# Patient Record
Sex: FEMALE | Race: ASIAN | NOT HISPANIC OR LATINO
[De-identification: names, ages, dates, MRNs, and addresses within clinical notes are randomized per-mention and may not be internally consistent; named-entity substitution may affect disease eponyms.]

---

## 2018-02-20 PROBLEM — Z00.00 ENCOUNTER FOR PREVENTIVE HEALTH EXAMINATION: Status: ACTIVE | Noted: 2018-02-20

## 2018-02-27 ENCOUNTER — APPOINTMENT (OUTPATIENT)
Dept: ULTRASOUND IMAGING | Facility: IMAGING CENTER | Age: 38
End: 2018-02-27

## 2018-02-27 ENCOUNTER — APPOINTMENT (OUTPATIENT)
Dept: MAMMOGRAPHY | Facility: IMAGING CENTER | Age: 38
End: 2018-02-27

## 2019-11-19 ENCOUNTER — EMERGENCY (EMERGENCY)
Facility: HOSPITAL | Age: 39
LOS: 1 days | Discharge: HOME | End: 2019-11-19
Admitting: EMERGENCY MEDICINE
Payer: MEDICAID

## 2019-11-19 VITALS
DIASTOLIC BLOOD PRESSURE: 87 MMHG | OXYGEN SATURATION: 100 % | HEART RATE: 72 BPM | TEMPERATURE: 98 F | SYSTOLIC BLOOD PRESSURE: 151 MMHG | RESPIRATION RATE: 16 BRPM

## 2019-11-19 PROCEDURE — 99283 EMERGENCY DEPT VISIT LOW MDM: CPT

## 2019-11-19 RX ORDER — OXYCODONE AND ACETAMINOPHEN 5; 325 MG/1; MG/1
1 TABLET ORAL ONCE
Refills: 0 | Status: DISCONTINUED | OUTPATIENT
Start: 2019-11-19 | End: 2019-11-19

## 2019-11-19 RX ADMIN — OXYCODONE AND ACETAMINOPHEN 1 TABLET(S): 5; 325 TABLET ORAL at 02:50

## 2019-11-19 NOTE — ED PROVIDER NOTE - TOOTH FINDINGS
PERCUSSION TENDERNESS/+ percussion tenderness of the 20th tooth. dental filling in place. no abscess formation noted

## 2019-11-19 NOTE — ED PROVIDER NOTE - OBJECTIVE STATEMENT
Patient is a 40 y/o F presents with pain of the 20th tooth x today. patient was evaluated by dentist today and a  temporary filling was placed in tooth, she was started on clindamycin and tylenol and ibuprofen. patient is scheduled for root canal on monday. she denies fever, chills, n/v, sore throat.

## 2019-11-19 NOTE — ED ADULT TRIAGE NOTE - CHIEF COMPLAINT QUOTE
pt arrives w/ c/o lower tooth pain. pt states she needs root canal but did not get it yet. pt states on abx right now and Motrin but pain not improving.

## 2019-11-19 NOTE — ED PROVIDER NOTE - NSFOLLOWUPINSTRUCTIONS_ED_ALL_ED_FT
Please continue to take medications as prescribed. If you experience worsening pain, fever, chills, n/v, return to the ED

## 2019-11-19 NOTE — ED PROVIDER NOTE - CLINICAL SUMMARY MEDICAL DECISION MAKING FREE TEXT BOX
38 y/o M presents with acute dental pain of 20th tooth after procedure today. no abscess noted. patient will be treated with analgesic and advised to f/u with Dentist as scheduled.

## 2019-11-19 NOTE — ED PROVIDER NOTE - PATIENT PORTAL LINK FT
You can access the FollowMyHealth Patient Portal offered by [facility] by registering at the following website: [email or web address]. By joining [employer]’s FollowMyHealth portal, you will also be able to view your health information using other applications (apps) compatible with our system.

## 2022-09-20 PROBLEM — Z78.9 OTHER SPECIFIED HEALTH STATUS: Chronic | Status: ACTIVE | Noted: 2019-11-19

## 2022-11-22 ENCOUNTER — APPOINTMENT (OUTPATIENT)
Dept: DERMATOLOGY | Facility: CLINIC | Age: 42
End: 2022-11-22

## 2023-07-11 ENCOUNTER — NON-APPOINTMENT (OUTPATIENT)
Age: 43
End: 2023-07-11

## 2025-03-21 ENCOUNTER — EMERGENCY (EMERGENCY)
Facility: HOSPITAL | Age: 45
LOS: 1 days | Discharge: HOME | End: 2025-03-21
Attending: EMERGENCY MEDICINE | Admitting: EMERGENCY MEDICINE
Payer: MEDICAID

## 2025-03-21 VITALS
SYSTOLIC BLOOD PRESSURE: 121 MMHG | RESPIRATION RATE: 18 BRPM | TEMPERATURE: 98 F | WEIGHT: 149.91 LBS | OXYGEN SATURATION: 98 % | DIASTOLIC BLOOD PRESSURE: 66 MMHG | HEART RATE: 82 BPM | HEIGHT: 63 IN

## 2025-03-21 LAB
APPEARANCE UR: ABNORMAL
BACTERIA # UR AUTO: ABNORMAL /HPF
BILIRUB UR-MCNC: NEGATIVE — SIGNIFICANT CHANGE UP
CAST: 0 /LPF — SIGNIFICANT CHANGE UP (ref 0–4)
COLOR SPEC: ABNORMAL
DIFF PNL FLD: ABNORMAL
GLUCOSE UR QL: NEGATIVE MG/DL — SIGNIFICANT CHANGE UP
KETONES UR-MCNC: NEGATIVE MG/DL — SIGNIFICANT CHANGE UP
LEUKOCYTE ESTERASE UR-ACNC: ABNORMAL
NITRITE UR-MCNC: NEGATIVE — SIGNIFICANT CHANGE UP
PH UR: 6.5 — SIGNIFICANT CHANGE UP (ref 5–8)
PROT UR-MCNC: 100 MG/DL
RBC CASTS # UR COMP ASSIST: 177 /HPF — HIGH (ref 0–4)
SP GR SPEC: 1.01 — SIGNIFICANT CHANGE UP (ref 1–1.03)
SQUAMOUS # UR AUTO: 1 /HPF — SIGNIFICANT CHANGE UP (ref 0–5)
UROBILINOGEN FLD QL: 0.2 MG/DL — SIGNIFICANT CHANGE UP (ref 0.2–1)
WBC UR QL: 1109 /HPF — HIGH (ref 0–5)

## 2025-03-21 PROCEDURE — 99284 EMERGENCY DEPT VISIT MOD MDM: CPT

## 2025-03-21 RX ORDER — NITROFURANTOIN MACROCRYSTAL 100 MG
1 CAPSULE ORAL
Qty: 10 | Refills: 0
Start: 2025-03-21 | End: 2025-03-25

## 2025-03-21 RX ORDER — NITROFURANTOIN MACROCRYSTAL 100 MG
100 CAPSULE ORAL ONCE
Refills: 0 | Status: COMPLETED | OUTPATIENT
Start: 2025-03-21 | End: 2025-03-21

## 2025-03-21 RX ADMIN — Medication 100 MILLIGRAM(S): at 08:30

## 2025-03-21 NOTE — ED ADULT NURSE NOTE - CCCP TRG CHIEF CMPLNT
----- Message from Gay Tiara sent at 8/3/2023  3:31 PM CDT -----  Regarding: advice  Contact: patient  Type: Needs Medical Advice  Who Called:  patient  Symptoms (please be specific):    How long has patient had these symptoms:    Pharmacy name and phone #:    [facility]  [address] [facility] [address] [place] [address]  Phone: [phone] Fax: [phone]      Best Call Back Number: [phone] (home)     Additional Information: Patient states he picked up his paperwork from the office and the prescription for the apixaban (ELIQUIS) 2.5 mg Tab was not with the paperwork.  Please call patient to advise.  Thanks!         urinary symptoms

## 2025-03-21 NOTE — ED PROVIDER NOTE - ATTENDING CONTRIBUTION TO CARE
Raheel: I have seen and examined the patient face to face, have reviewed and addended the HPI, PE and a/p as necessary.     43 yo F with hypothyroidism a/w lower abdominal pain and dysuria and polyuria.  PT reports having feeling of incomplete bladder emptying.  Reports history of UTIs.    She has not had any UTIs recently.  Does not have any back pain any fevers or chills nausea or vomiting.  No lower extremity swelling recent travel any vaginal bleeding or discharge.    GEN - NAD; well appearing; A+O x3; non-toxic appearing  CARD -s1s2, RRR, no M,G,R;   PULM - CTA b/l, symmetric breath sounds;   ABD -  +BS, TTP in suprapubic region, soft, no guarding, no rebound, no masses;   BACK - no CVA tenderness, Normal  spine;   EXT - symmetric pulses, 2+ dp, capillary refill < 2 seconds, no cyanosis, no edema;   NEURO - no focal neuro deficits, no slurred speech    Concern for UTI, no flank pain, no lateral abdominal tenderness no fevers or chills to suggest pyelonephritis or kidney stone.  Will treat symptomatically and likely d/c home with outpatient follow up.

## 2025-03-21 NOTE — ED PROVIDER NOTE - PATIENT PORTAL LINK FT
You can access the FollowMyHealth Patient Portal offered by [facility] by registering at the following website: [email or web address]. By joining [employer]’s FollowMyHealth portal, you will also be able to view your health information using other applications (apps) compatible with our system.

## 2025-03-21 NOTE — ED PROVIDER NOTE - PHYSICAL EXAMINATION
Alert and oriented x 4 resting in bed comfortably.  Heart and lung sounds grossly normal to auscultation.  Suprapubic tenderness but otherwise benign abdominal exam.  No CVA tenderness to percussion.  Lower extremities without edema bilaterally.

## 2025-03-21 NOTE — ED PROVIDER NOTE - PROGRESS NOTE DETAILS
UA positive, urine pregnancy point-of-care test negative, glucose 97.  Will discharge home with antibiotics.  Patient is comfortable and agrees with this plan.  Return indications discussed.  Patient to follow-up with PCP for hematuria which we discussed.

## 2025-03-21 NOTE — ED PROVIDER NOTE - NSFOLLOWUPINSTRUCTIONS_ED_ALL_ED_FT
A urinary tract infection (UTI) is an infection caused by bacteria. It can happen anywhere in the urinary tract. A UTI can happen in the:    Kidneys.  Ureters, the tubes that connect the kidneys to the bladder.  Bladder.  Urethra, where the urine comes out.  Most UTIs are bladder infections. They often cause pain or burning when you urinate.    Most UTIs can be cured with antibiotics. If you are prescribed antibiotics, be sure to complete your treatment so that the infection does not get worse.    Follow-up care is a key part of your treatment and safety. Be sure to make and go to all appointments, and call your doctor if you are having problems. It's also a good idea to know your test results and keep a list of the medicines you take.    How can you care for yourself at home?  Take your antibiotics as directed. Do not stop taking them just because you feel better. You need to take the full course of antibiotics.  Drink extra water and other fluids for the next day or two. This will help make the urine less concentrated and help wash out the bacteria that are causing the infection. (If you have kidney, heart, or liver disease and have to limit fluids, talk with your doctor before you increase the amount of fluids you drink.)  Avoid drinks that are carbonated or have caffeine. They can irritate the bladder.  Urinate often. Try to empty your bladder each time.  To relieve pain, take a hot bath or lay a heating pad set on low over your lower belly or genital area. Never go to sleep with a heating pad in place.  To prevent UTIs  Drink plenty of water each day. This helps you urinate often, which clears bacteria from your system. (If you have kidney, heart, or liver disease and have to limit fluids, talk with your doctor before you increase the amount of fluids you drink.)  Urinate when you need to.  If you are sexually active, urinate right after you have sex.  Change sanitary pads often.  Avoid douches, bubble baths, feminine hygiene sprays, and other feminine hygiene products that have deodorants.  After going to the bathroom, wipe from front to back.  When should you call for help?  	  Call your doctor now or seek immediate medical care if:    You have new or worse fever, chills, nausea, or vomiting.  You have new pain in your back just below your rib cage. This is called flank pain.  There is new blood or pus in your urine.  You have any problems with your antibiotic medicine.  Watch closely for changes in your health, and be sure to contact your doctor if:    You are not getting better after taking an antibiotic for 2 days.  Your symptoms go away but then come back.

## 2025-03-21 NOTE — ED ADULT NURSE NOTE - OBJECTIVE STATEMENT
Received pt in room 8, Pt is A&Ox4 with past medical history of Hyperthyroidism. Pt came to the ED due to having Polyuria, hematuria, dysuria that started yesterday, Pt reports that she has burning sensation when urinating. Pt abdomen is soft and nondistended. Pt breathing is equal and nonlabored. Pt denies any chest pain, SOB, headache, nausea, vomiting, diarrhea, fever or chills. pt safety maintained.

## 2025-03-21 NOTE — ED PROVIDER NOTE - OBJECTIVE STATEMENT
44-year-old female with history of hypothyroidism presenting to the emergency department with 24 hours of lower abdominal pain and dysuria and feeling of incomplete bladder emptying.  Notes that she has had UTIs in the past and that this feels similar.  She has not had any UTIs recently.  Does not have any back pain any fevers or chills nausea or vomiting.  No lower extremity swelling recent travel any vaginal bleeding or discharge.

## 2025-03-21 NOTE — ED ADULT TRIAGE NOTE - CHIEF COMPLAINT QUOTE
c/o of polyuria, hematuria and dysuria since midnight tonight. Pt states the dysuria and hematuria comes and goes but is not consistent. Pt denies flank pain, CP, SOB, N/V/D. Fever and Chills. Hx Hyperthyroidism

## 2025-03-22 LAB
CULTURE RESULTS: SIGNIFICANT CHANGE UP
SPECIMEN SOURCE: SIGNIFICANT CHANGE UP